# Patient Record
(demographics unavailable — no encounter records)

---

## 2025-07-17 NOTE — HISTORY OF PRESENT ILLNESS
[Ear Fullness] : ear fullness [Tinnitus] : tinnitus [Dizziness] : dizziness [Hearing Loss] : hearing loss [Otalgia] : otalgia [de-identified] : 66-year-old female presents for initial evaluation for: tinnitus, hearing loss.  Reports that in Jan 2025 she noticed ringing in ear. Has tinnitus in both ears. Right worse than left. Describes sound as static.  At that time, she felt she had a cold. Occasional pain in left ear.  Believed maybe it was allergies because of cough. Went to PCP and referred pt to ENT Allergy. Audiogram 2/3/25. Took Flonase which gave her headaches. Complaining of headaches and noticed sound is becoming a screeching. Few days ago, she started having occasional ear pain. Left side worse than right. MRI 5/7/25. MRA 6/23/25.  No further complaints or concerns.  [Anxiety] : no anxiety [Headache] : no headache [Neurologic Symptoms] : no associated neurologic symptoms [Orthostatic Hypotension] : no orthostatic hypotension [Otorrhea] : no otorrhea [Vertigo] : no vertigo [Visual Changes] : no visual changes [Recurrent Otitis Media] : no recurrent otitis media [Meningitis] : no meningitis [Glomus Tumor] : no glomus tumor [Otitis Media with Effusion] : no otitis media with effusion [Stroke] : no stroke [Acoustic Neuroma] : no acoustic neuroma [Presbycusis] : no presbycusis [Prior Ear Surgery] : no prior ear surgery [Facial Nerve Paralysis] : no facial nerve paralysis [Congenital Ear Malformation] : no congenital ear malformation [Allergic Rhinitis] : no allergic rhinitis [Major Depression] : no major depression [Meniere Disease] : no Meniere disease [Eustachian Tube Dysfunction] : no eustachian tube dysfunction [Diabetes] : no diabetes [Otosclerosis] : no otosclerosis [Cholesteatoma] : no cholesteatoma [Multiple Sclerosis] : no multiple sclerosis [Perilymphatic Fistula] : no perilymphatic fistula [Autoimmune Diseases] : no autoimmune diseases [Cardiac Disease] : no cardiac disease [Hypertension] : no hypertension [Hypotension] : no hypotension [Ototoxic Med Exposure] : no ototoxic medication exposure [Hx of Radiation Therapy] : no history of radiation therapy [Smoking] : no smoking

## 2025-07-17 NOTE — ASSESSMENT
[FreeTextEntry1] : Reviewed audiogram Discussed etiology of tinnitus and treatment, including masking and possibility of pharmacotherapy Reviewed relationship between tinnitus and mental/physical stressors